# Patient Record
Sex: FEMALE | Race: BLACK OR AFRICAN AMERICAN
[De-identification: names, ages, dates, MRNs, and addresses within clinical notes are randomized per-mention and may not be internally consistent; named-entity substitution may affect disease eponyms.]

---

## 2020-10-14 ENCOUNTER — HOSPITAL ENCOUNTER (EMERGENCY)
Dept: HOSPITAL 72 - EMR | Age: 70
Discharge: HOME | End: 2020-10-14
Payer: COMMERCIAL

## 2020-10-14 VITALS — DIASTOLIC BLOOD PRESSURE: 81 MMHG | SYSTOLIC BLOOD PRESSURE: 153 MMHG

## 2020-10-14 VITALS — SYSTOLIC BLOOD PRESSURE: 142 MMHG | DIASTOLIC BLOOD PRESSURE: 77 MMHG

## 2020-10-14 VITALS — BODY MASS INDEX: 12.49 KG/M2 | WEIGHT: 54 LBS | HEIGHT: 55 IN

## 2020-10-14 DIAGNOSIS — N39.0: Primary | ICD-10-CM

## 2020-10-14 DIAGNOSIS — R62.50: ICD-10-CM

## 2020-10-14 LAB
APPEARANCE UR: (no result)
APTT PPP: YELLOW S
GLUCOSE UR STRIP-MCNC: NEGATIVE MG/DL
KETONES UR QL STRIP: NEGATIVE
LEUKOCYTE ESTERASE UR QL STRIP: (no result)
NITRITE UR QL STRIP: NEGATIVE
PH UR STRIP: 5 [PH] (ref 4.5–8)
PROT UR QL STRIP: (no result)
SP GR UR STRIP: 1.02 (ref 1–1.03)
UROBILINOGEN UR-MCNC: NORMAL MG/DL (ref 0–1)

## 2020-10-14 PROCEDURE — 99283 EMERGENCY DEPT VISIT LOW MDM: CPT

## 2020-10-14 PROCEDURE — 81003 URINALYSIS AUTO W/O SCOPE: CPT

## 2020-10-14 PROCEDURE — 87210 SMEAR WET MOUNT SALINE/INK: CPT

## 2020-10-14 PROCEDURE — 87086 URINE CULTURE/COLONY COUNT: CPT

## 2020-10-14 NOTE — EMERGENCY ROOM REPORT
History of Present Illness


General


Chief Complaint:  Female Urogenital Problems


Source:  Family Member





Present Illness


HPI


Disclaimer: Please note that this report is being documented using [employer]AG[employer] 

technology. This can lead to erroneous entry secondary to incorrect 

interpretation by the dictating instrument.





HPI: 70-year-old female history of developmental delay presents for evaluation 

of vaginal discharge.  Patient is nonverbal presents with her cousin who is her 

legal guardian.  Cousin states patient has been having green/yellow vaginal 

discharge for the past 2 weeks.  No obvious bleeding.  Had an appointment to see

OB/GYN but pelvic exam could not be performed during that visit due to patient 

agitation.  Cousin denies fever, vomiting, diarrhea, rash other changes in her 

health.  States this happened once 5 months ago and was resolved with antibiotic

medications.


 


PMH: Developmental delay, nonverbal


 


PSH: Reviewed


 


Allergies: Reviewed


 


Social Hx: None reported


Allergies:  


Coded Allergies:  


     No Known Allergies (Unverified , 10/14/20)





COVID-19 Screening


Contact w/high risk pt:  No


Experienced COVID-19 symptoms?:  No


COVID-19 Testing performed PTA:  No





Nursing Documentation-PMH


Past Medical History:  No History, Except For





Review of Systems


All Other Systems:  limited - Unable to obtain from patient due to nonverbal 

status





Physical Exam





Vital Signs








  Date Time  Temp Pulse Resp B/P (MAP) Pulse Ox O2 Delivery O2 Flow Rate FiO2


 


10/14/20 11:18 97.9 67 20 153/81 (105) 100 Room Air  





 





General: Awake and seemingly alert, no acute distress


HEENT: NC/AT. EOMI. 


Resp: Normal work of breathing


Abdomen: Soft, nontender, nondistended.  


: Normal-appearing external genitalia.  There is a mole on the left lower 

labia.  No bleeding, no erythema does not appear tender.  Mild yellow purulent 

material in the vagina.  Minimal bleeding.


Skin: Intact.  No abrasions, laceration or rash over the exposed skin


MSK: Normal tone and bulk. Moving all extremities.  No obvious deformity.


Neuro: Awake and seems alert.  Communicates nonverbally and follows commands.  

Nonverbal.





Medical Decision Making


Diagnostic Impression:  


   Primary Impression:  


   UTI (urinary tract infection)


ER Course


Is a 70-year-old female with history of developmental delay brought in by 

caretaker for evaluation of vaginal discharge.  Differential includes is not 

limited to UTI, BV, endometriosis, PID, Trichomonas, candidiasis among others.  

Obtain urine with straight catheterization.  Urinalysis shows 3+ leukocyte 

esterase with innumerable white cells, red cells but only few bacteria.  No 

significant findings on wet mount.  Will be treated for urinary tract infection 

and discharged to follow-up with her PMD.  Encouraged her to obtain outpatient 

imaging her doctor as already ordered.  She is otherwise well-appearing no signs

of systemic infection.  Follow-up on an outpatient basis.  Instructed to return 

with new or worsening symptoms.  Caretaker understands and agrees with the 

treatment plan.





Last Vital Signs








  Date Time  Temp Pulse Resp B/P (MAP) Pulse Ox O2 Delivery O2 Flow Rate FiO2


 


10/14/20 11:25 97.9 71 20 153/81 100 Room Air  








Disposition:  HOME, SELF-CARE


Condition:  Stable


Scripts


Nitrofurantoin Monohyd/M-Cryst* (MACROBID 100 MG*) 100 Mg Capsule


100 MG ORAL EVERY 12 HOURS for 10 Days, #20 CAP


   Prov: Edward Heaton MD         10/14/20











Edward Heaton MD              Oct 14, 2020 11:34